# Patient Record
Sex: FEMALE | Race: WHITE | NOT HISPANIC OR LATINO | Employment: OTHER | URBAN - METROPOLITAN AREA
[De-identification: names, ages, dates, MRNs, and addresses within clinical notes are randomized per-mention and may not be internally consistent; named-entity substitution may affect disease eponyms.]

---

## 2022-06-20 ENCOUNTER — OFFICE VISIT (OUTPATIENT)
Dept: URGENT CARE | Facility: CLINIC | Age: 57
End: 2022-06-20
Payer: COMMERCIAL

## 2022-06-20 VITALS
OXYGEN SATURATION: 99 % | RESPIRATION RATE: 20 BRPM | DIASTOLIC BLOOD PRESSURE: 88 MMHG | HEART RATE: 84 BPM | TEMPERATURE: 98.2 F | SYSTOLIC BLOOD PRESSURE: 150 MMHG | WEIGHT: 160 LBS

## 2022-06-20 DIAGNOSIS — R53.83 FATIGUE, UNSPECIFIED TYPE: Primary | ICD-10-CM

## 2022-06-20 PROCEDURE — G0382 LEV 3 HOSP TYPE B ED VISIT: HCPCS | Performed by: FAMILY MEDICINE

## 2022-06-20 RX ORDER — METHENAMINE HIPPURATE 1000 MG/1
1 TABLET ORAL DAILY
COMMUNITY
Start: 2022-05-28

## 2022-06-20 NOTE — PATIENT INSTRUCTIONS
In office during test was negative  Mild cold-like symptoms over the past week could be due to a low-lying virus which the fatigue is still asymptomatic  Would recommend follow-up with a primary care provider for full workup of fatigue should it persist   Follow up with PCP in 3-5 days  Proceed to  ER if symptoms worsen

## 2022-06-20 NOTE — PROGRESS NOTES
[facility]        NAME: Faraz Hahn is a 49 y o  female  : 1965    MRN: [id number]  DATE: 2022  TIME: 12:45 PM    Assessment and Plan   Fatigue, unspecified type [R53 [id number]]  1  Fatigue, unspecified type           Patient Instructions     In office during test was negative  Mild cold-like symptoms over the past week could be due to a low-lying virus which the fatigue is still asymptomatic  Would recommend follow-up with a primary care provider for full workup of fatigue should it persist   Follow up with PCP in 3-5 days  Proceed to  ER if symptoms worsen  Chief Complaint     Chief Complaint   Patient presents with    Neck Pain         History of Present Illness       Patient states she has had vague possibly flu-like symptoms for the past week  She did have fever and chills about a week ago with a negative COVID test   She has had fatigue  She does have some enlarged lymph nodes on the left side with some headaches and neck pain  Her  sees a homeopath and was diagnosed with Shivani Patient and she is wondering if she now has it  Neck Pain         Review of Systems   Review of Systems   Constitutional: Negative  Respiratory: Negative  Cardiovascular: Negative  Musculoskeletal: Positive for neck pain  Current Medications       Current Outpatient Medications:     methenamine hippurate (HIPREX) 1 g tablet, Take 1 g by mouth daily, Disp: , Rfl:     Current Allergies     Allergies as of 2022    (No Known Allergies)            The following portions of the patient's history were reviewed and updated as appropriate: allergies, current medications, past family history, past medical history, past social history, past surgical history and problem list      Past Medical History:   Diagnosis Date    Hypertension     Urinary tract infection        History reviewed  No pertinent surgical history  History reviewed   No pertinent family history  Medications have been verified  Objective   /88   Pulse 84   Temp 98 2 °F (36 8 °C)   Resp 20   Wt 72 6 kg (160 lb)   SpO2 99%   No LMP recorded  Patient is perimenopausal        Physical Exam     Physical Exam  Vitals and nursing note reviewed  Constitutional:       Appearance: Normal appearance  She is well-developed  HENT:      Right Ear: Tympanic membrane and external ear normal       Left Ear: Tympanic membrane and external ear normal       Nose: Nose normal       Mouth/Throat:      Mouth: Mucous membranes are moist       Pharynx: No oropharyngeal exudate  Eyes:      Conjunctiva/sclera: Conjunctivae normal    Cardiovascular:      Rate and Rhythm: Normal rate and regular rhythm  Heart sounds: Normal heart sounds  No murmur heard  Pulmonary:      Effort: Pulmonary effort is normal  No respiratory distress  Breath sounds: Normal breath sounds  No wheezing or rales  Chest:      Chest wall: No tenderness  Abdominal:      Palpations: Abdomen is soft  Musculoskeletal:         General: Normal range of motion  Cervical back: Normal range of motion and neck supple  Skin:     General: Skin is warm  Findings: No erythema or rash  Neurological:      Mental Status: She is alert and oriented to person, place, and time